# Patient Record
Sex: MALE | URBAN - METROPOLITAN AREA
[De-identification: names, ages, dates, MRNs, and addresses within clinical notes are randomized per-mention and may not be internally consistent; named-entity substitution may affect disease eponyms.]

---

## 2017-09-15 ENCOUNTER — HOSPITAL ENCOUNTER (OUTPATIENT)
Dept: MEDSURG UNIT | Facility: HOSPITAL | Age: 42
End: 2017-09-16
Attending: ORTHOPAEDIC SURGERY | Admitting: ORTHOPAEDIC SURGERY

## 2017-09-15 LAB
ABS NEUT (OLG): 3.9 X10(3)/MCL (ref 2.1–9.2)
ALBUMIN SERPL-MCNC: 3.3 GM/DL (ref 3.4–5)
ALBUMIN/GLOB SERPL: 1.2 RATIO (ref 1.1–2)
ALP SERPL-CCNC: 50 UNIT/L (ref 50–136)
ALT SERPL-CCNC: 47 UNIT/L (ref 12–78)
AST SERPL-CCNC: 24 UNIT/L (ref 15–37)
BASOPHILS # BLD AUTO: 0 X10(3)/MCL (ref 0–0.2)
BASOPHILS NFR BLD AUTO: 0 %
BILIRUB SERPL-MCNC: 0.3 MG/DL (ref 0.2–1)
BILIRUBIN DIRECT+TOT PNL SERPL-MCNC: 0.1 MG/DL (ref 0–0.5)
BILIRUBIN DIRECT+TOT PNL SERPL-MCNC: 0.2 MG/DL (ref 0–0.8)
BUN SERPL-MCNC: 18 MG/DL (ref 7–18)
CALCIUM SERPL-MCNC: 8.4 MG/DL (ref 8.5–10.1)
CHLORIDE SERPL-SCNC: 109 MMOL/L (ref 98–107)
CO2 SERPL-SCNC: 23 MMOL/L (ref 21–32)
CREAT SERPL-MCNC: 1.18 MG/DL (ref 0.7–1.3)
EOSINOPHIL # BLD AUTO: 0.1 X10(3)/MCL (ref 0–0.9)
EOSINOPHIL NFR BLD AUTO: 2 %
ERYTHROCYTE [DISTWIDTH] IN BLOOD BY AUTOMATED COUNT: 13.2 % (ref 11.5–17)
ETHANOL SERPL-MCNC: 24 MG/DL (ref 0–3)
GLOBULIN SER-MCNC: 2.8 GM/DL (ref 2.4–3.5)
GLUCOSE SERPL-MCNC: 126 MG/DL (ref 74–106)
HCT VFR BLD AUTO: 37.8 % (ref 42–52)
HGB BLD-MCNC: 13.2 GM/DL (ref 14–18)
LYMPHOCYTES # BLD AUTO: 2 X10(3)/MCL (ref 0.6–4.6)
LYMPHOCYTES NFR BLD AUTO: 30 %
MCH RBC QN AUTO: 29.3 PG (ref 27–31)
MCHC RBC AUTO-ENTMCNC: 34.9 GM/DL (ref 33–36)
MCV RBC AUTO: 83.8 FL (ref 80–94)
MONOCYTES # BLD AUTO: 0.6 X10(3)/MCL (ref 0.1–1.3)
MONOCYTES NFR BLD AUTO: 9 %
NEUTROPHILS # BLD AUTO: 3.9 X10(3)/MCL (ref 1.4–7.9)
NEUTROPHILS NFR BLD AUTO: 59 %
PLATELET # BLD AUTO: 157 X10(3)/MCL (ref 130–400)
PMV BLD AUTO: 9.8 FL (ref 9.4–12.4)
POTASSIUM SERPL-SCNC: 4 MMOL/L (ref 3.5–5.1)
PROT SERPL-MCNC: 6.1 GM/DL (ref 6.4–8.2)
RBC # BLD AUTO: 4.51 X10(6)/MCL (ref 4.7–6.1)
SODIUM SERPL-SCNC: 143 MMOL/L (ref 136–145)
WBC # SPEC AUTO: 6.6 X10(3)/MCL (ref 4.5–11.5)

## 2017-09-16 LAB
ABS NEUT (OLG): 9.27 X10(3)/MCL (ref 2.1–9.2)
APPEARANCE, UA: CLEAR
BACTERIA SPEC CULT: NORMAL /HPF
BASOPHILS # BLD AUTO: 0 X10(3)/MCL (ref 0–0.2)
BASOPHILS NFR BLD AUTO: 0 %
BILIRUB UR QL STRIP: NEGATIVE
BUN SERPL-MCNC: 15 MG/DL (ref 7–18)
CALCIUM SERPL-MCNC: 8.5 MG/DL (ref 8.5–10.1)
CHLORIDE SERPL-SCNC: 110 MMOL/L (ref 98–107)
CO2 SERPL-SCNC: 19 MMOL/L (ref 21–32)
COLOR UR: YELLOW
CREAT SERPL-MCNC: 0.98 MG/DL (ref 0.7–1.3)
EOSINOPHIL # BLD AUTO: 0 X10(3)/MCL (ref 0–0.9)
EOSINOPHIL NFR BLD AUTO: 0 %
ERYTHROCYTE [DISTWIDTH] IN BLOOD BY AUTOMATED COUNT: 13.2 % (ref 11.5–17)
GLUCOSE (UA): NEGATIVE
GLUCOSE SERPL-MCNC: 137 MG/DL (ref 74–106)
HCT VFR BLD AUTO: 40.9 % (ref 42–52)
HGB BLD-MCNC: 13.6 GM/DL (ref 14–18)
HGB UR QL STRIP: NEGATIVE
KETONES UR QL STRIP: NEGATIVE
LEUKOCYTE ESTERASE UR QL STRIP: NEGATIVE
LYMPHOCYTES # BLD AUTO: 0.8 X10(3)/MCL (ref 0.6–4.6)
LYMPHOCYTES NFR BLD AUTO: 8 %
MCH RBC QN AUTO: 27.7 PG (ref 27–31)
MCHC RBC AUTO-ENTMCNC: 33.3 GM/DL (ref 33–36)
MCV RBC AUTO: 83.3 FL (ref 80–94)
MONOCYTES # BLD AUTO: 0.3 X10(3)/MCL (ref 0.1–1.3)
MONOCYTES NFR BLD AUTO: 3 %
NEUTROPHILS # BLD AUTO: 9.27 X10(3)/MCL (ref 2.1–9.2)
NEUTROPHILS NFR BLD AUTO: 88 %
NITRITE UR QL STRIP: NEGATIVE
PH UR STRIP: 5.5 [PH] (ref 5–9)
PLATELET # BLD AUTO: 173 X10(3)/MCL (ref 130–400)
PMV BLD AUTO: 9.8 FL (ref 9.4–12.4)
POTASSIUM SERPL-SCNC: 4.3 MMOL/L (ref 3.5–5.1)
PROT UR QL STRIP: NEGATIVE
RBC # BLD AUTO: 4.91 X10(6)/MCL (ref 4.7–6.1)
RBC #/AREA URNS HPF: NORMAL /[HPF]
SODIUM SERPL-SCNC: 140 MMOL/L (ref 136–145)
SP GR UR STRIP: 1.02 (ref 1–1.03)
SQUAMOUS EPITHELIAL, UA: NORMAL
UROBILINOGEN UR STRIP-ACNC: 0.2
WBC # SPEC AUTO: 10.5 X10(3)/MCL (ref 4.5–11.5)
WBC #/AREA URNS HPF: NORMAL /HPF

## 2022-04-11 ENCOUNTER — HISTORICAL (OUTPATIENT)
Dept: ADMINISTRATIVE | Facility: HOSPITAL | Age: 47
End: 2022-04-11

## 2022-04-28 VITALS
DIASTOLIC BLOOD PRESSURE: 69 MMHG | BODY MASS INDEX: 27.26 KG/M2 | HEIGHT: 62 IN | SYSTOLIC BLOOD PRESSURE: 113 MMHG | WEIGHT: 148.13 LBS

## 2022-05-01 NOTE — ED PROVIDER NOTES
Patient:   Nickolas Gleason             MRN: [id number]            FIN: [id number]               Age:   42 years     Sex:  Male     :  1975   Associated Diagnoses:   Laceration of foot, right, complicated; Right foot injury; Foot injury - Major; Amputation of toe, right, traumatic, complicated   Author:   Prashant Covarrubias MD      Basic Information   Time seen: Date & time 9/15/2017 20:31:00.   History source: Patient.   Arrival mode: Ambulance.   History limitation: Language barrier.   Additional information: Patient's physician(s): None, Chief Complaint from Nursing Triage Note : Chief Complaint   9/15/2017 20:26 CDT      Chief Complaint           laceration to right foot with skill saw. fentanyl 100 mcg per ems  .      History of Present Illness   The patient presents with Foot injury from power Skil saw,     43 y/o male with no significant medical hx presents to the ED via EMS due to R foot laceration from dropped circular saw while cutting wood at home PTA (9/15/17). Pt was barefoot at time of injury. Pt admits to +ETOH, two beers this evening. and Patient said he was barefoot this evening.  Working at the house.  Lacerated his right foot with the Skil saw admits to drinking.  The onset was just prior to arrival.  The course/duration of symptoms is constant.  Type of injury: laceration.  Location: Right feet toes. The character of symptoms is pain and bleeding.  The degree at present is moderate.  The exacerbating factor is none.  The relieving factor is none.  Risk factors consist of none.  Prior episodes: none.  Therapy today: emergency medical services.  Associated symptoms: none.        Review of Systems   Constitutional symptoms:  Negative except as documented in HPI.   Skin symptoms:  Negative except as documented in HPI.   Eye symptoms:  Negative except as documented in HPI.   ENMT symptoms:  Negative except as documented in HPI.   Respiratory symptoms:  Negative except as documented in HPI.    Cardiovascular symptoms:  Negative except as documented in HPI.   Gastrointestinal symptoms:  Negative except as documented in HPI.   Genitourinary symptoms:  Negative except as documented in HPI.   Musculoskeletal symptoms:  Reports: Right, foot, pain, trauma (laceration).   Neurologic symptoms:  Negative except as documented in HPI.   Psychiatric symptoms:  Negative except as documented in HPI.   Endocrine symptoms:  Negative except as documented in HPI.   Hematologic/Lymphatic symptoms:  Negative except as documented in HPI.   Allergy/immunologic symptoms:  Negative except as documented in HPI.             Additional review of systems information: All other systems reviewed and otherwise negative.      Health Status   Allergies:    Allergic Reactions (Selected)  No Known Medication Allergies.   Medications:  (Selected)   Inpatient Medications  Ordered  Ancef: 2 gm, form: Vial, IV, Once, Infuse over: 30 minute(s), first dose 09/15/17 20:50:00 CDT, stop date 09/15/17 20:50:00 CDT, STAT, 24  Dilaudid: 0.5 mg, form: Injection, IV Push, Once, first dose 09/15/17 20:50:00 CDT, stop date 09/15/17 20:50:00 CDT, STAT, 24  Sodium Chloride 0.9% 1000mL 1,000 mL: 1,000 mL, 1,000 mL, IV, 200 mL/hr, start date 09/15/17 20:36:00 CDT  Zofran 2 mg/mL injectable solution: 4 mg, form: Injection, IV Push, q4hr PRN for nausea, first dose 09/15/17 20:50:00 CDT, 26,051.   Immunizations: no tetanus up to date, no influenza, no pneumococcal.      Past Medical/ Family/ Social History   Medical history: Pt reports no medical hx..   Surgical history: Pt reports no surgical hx..   Family history: Pt reports no family medical hx..   Social history: Alcohol use: Occasionally, Tobacco use: Denies, Drug use: Denies, Occupation: Employed, Family/social situation: , intact family.      Physical Examination               Vital Signs   Vital Signs   9/15/2017 20:26 CDT      Temperature Temporal Artery               37.4 DegC                              Peripheral Pulse Rate     82 bpm                             Respiratory Rate          16 br/min                             SpO2                      97 %                             Oxygen Therapy            Room air                             Systolic Blood Pressure   156 mmHg  HI                             Diastolic Blood Pressure  98 mmHg  HI  .   Measurements   9/15/2017 20:26 CDT      Weight Estimated          95 kg                             Height/Length Estimated   175 cm                             Body Mass Index Estimated 31.02 kg/m2  .   Basic Oxygen Information   9/15/2017 20:26 CDT      SpO2                      97 %                             Oxygen Therapy            Room air  .   General:  Alert, mild distress, anxious, obese, Not ill-appearing,    Skin:  Warm, dry, no rash, normal for ethnicity.    Head:  Normocephalic, atraumatic.    Neck:  Supple, trachea midline, no tenderness, no JVD, no carotid bruit.    Eye:  Pupils are equal, round and reactive to light, extraocular movements are intact.    Ears, nose, mouth and throat:  Tympanic membranes clear, oral mucosa moist, no pharyngeal erythema or exudate.    Cardiovascular:  Regular rate and rhythm, No murmur, Normal peripheral perfusion, No edema.    Respiratory:  Lungs are clear to auscultation, respirations are non-labored, breath sounds are equal.    Chest wall:  No tenderness.   Back:  Nontender, Normal range of motion, Normal alignment, no step-offs.    Musculoskeletal:  Other extremities appear to be benign, Lower extremity: 8cm diagonal laceration across R foot, beginning at midshaft of the 1st metatarsal, and amputating 5th toe at approximately 1st phalanx. Great and 2nd toe likely intact; probable extensor tendon laceration to 3rd, 4th and 5th toe., Laceration extends into the sole of the foot.  Beneath the 5th toe.    Gastrointestinal:  Soft, Nontender, Non distended, Normal bowel sounds, No organomegaly, Obese.     Genitourinary:  no CVA tenderness.   Neurological:  Alert and oriented to person, place, time, and situation, No focal neurological deficit observed, CN II-XII intact, normal sensory observed, normal motor observed, normal speech observed, normal coordination observed.    Lymphatics:  No lymphadenopathy.   Psychiatric:  Cooperative, appropriate mood & affect, normal judgment.       Medical Decision Making   Differential Diagnosis:  Laceration with open fractures of foot and tendon lacerations.   Documents reviewed:  Emergency department nurses' notes.   Orders  Xray    XR Foot Right Minimum 3 Views, Satish ADAMS, Prashant KEY, 09/15/17, 20:35, Ordered.   Results review:  Lab results : Lab View   9/15/2017 21:00 CDT      WBC                       6.6 x10(3)/mcL                             RBC                       4.51 x10(6)/mcL  LOW                             Hgb                       13.2 gm/dL  LOW                             Hct                       37.8 %  LOW                             Platelet                  157 x10(3)/mcL                             MCV                       83.8 fL                             MCH                       29.3 pg                             MCHC                      34.9 gm/dL                             RDW                       13.2 %                             MPV                       9.8 fL                             Abs Neut                  3.90 x10(3)/mcL                             Neutro Auto               59 %  NA                             Lymph Auto                30 %  NA                             Mono Auto                 9 %  NA                             Eos Auto                  2 %  NA                             Abs Eos                   0.1 x10(3)/mcL                             Basophil Auto             0 %  NA                             Abs Neutro                3.90 x10(3)/mcL                             Abs Lymph                 2.0 x10(3)/mcL                              Abs Mono                  0.6 x10(3)/mcL                             Abs Baso                  0.0 x10(3)/mcL  .   Foot/toes x-ray findings:  Bony involvement of the head of the 4th metatarsal. 5th toe amputation just distal to the proximate phalanx. Extensive soft tissue injury.      Reexamination/ Reevaluation   Time: 9/15/2017 21:13:00 .   Interventions: Metacarpal block as well as subcutaneous infiltration of Marcaine was effective in resolving the patient's pain acutely 20 cc Marcane 0.5% plain.      Impression and Plan   Diagnosis   Laceration of foot, right, complicated (WRD05-QH S91.311A)   Right foot injury (NHT42-DW S99.921A)   Laceration of foot, right, complicated (YQS25-IZ S91.311A)   Foot injury - Major (PNED [id number])   Amputation of toe, right, traumatic, complicated (NYE47-RG S98.131A)   Amputation of right 5th toe just distal to proximal phalanx, open laceration bony injury to head of 4th metatarsal, laceration with extensive tendon involvement 3rd and 4th toe right foot fractures are open and nondisplaced      Calls-Consults   -  9/15/2017 21:06:00 , Olvin ADAMS, Norman AHN    Plan   Condition: Unchanged.    Disposition: Admit time  9/15/2017 21:16:00, Admit to Surgery.    Counseled: Patient, Regarding diagnosis, Regarding diagnostic results, Regarding treatment plan, Patient indicated understanding of instructions.    Notes: I, Lowell Rojas, acted solely as a scribe for and in the presence of Dr. Covarrubias who performed the service., I, Prashant Covarrubias MD, a physician licensed to practice in this state, have  performed the physical evaluation,  history gathering,  and medical decision making that is reflected in this record..   MARNI Covarrubias MD.

## 2022-05-01 NOTE — H&P
REASON FOR ADMISSION:  Laceration to the dorsal aspect of right foot.    CHIEF COMPLAINT:  Right foot pain.    HISTORY OF PRESENT ILLNESS:  The patient is a 42-year-old male who presented to the emergency department after laceration was sustained to his right foot when a SKIL saw fell while it was in motion onto the top of his right foot.  He was not wearing any shoes.  He was working at home.  He has a laceration to the dorsal aspect of the foot, and Orthopedics was consulted for management of the laceration with partial amputation of the right small toe and a laceration involving the bone at the head of the 4th metatarsal.  Upon my evaluation at the bedside, the patient is awake, alert, and oriented.    REVIEW OF SYSTEMS:  A 10-system review is performed, which is negative other than the History of Present Illness.    PAST MEDICAL HISTORY:  He reports no past medical history.    PAST SURGICAL HISTORY:  He reports no past surgical history.    SOCIAL HISTORY:  He reports occasional alcohol use.  He did drink approximately 2 beers today.  He denies any illicit drugs or tobacco.  He is  and works.    PHYSICAL EXAMINATION:  GENERAL:  He is in no apparent distress.  He is awake, alert, and oriented.   HEAD, EYES, EARS, NOSE, AND THROAT:  His extraocular movements are all intact.  He is normocephalic, atraumatic.   PULMONARY:  He has unlabored respirations.  Symmetric chest rise.   CARDIOVASCULAR:  He has a normal rate with regular rhythm and normal peripheral perfusion.   GI:  His abdomen is soft, nontender, and nondistended.     INTEGUMENTARY:  Except for his right foot, his skin is clean, dry, and intact with no lesions or abrasions.   MUSCULOSKELETAL:  Evaluation of the right lower extremity:  He has a large laceration of the dorsal aspect of his right foot extending obliquely in the mid portion of the dorsum of the foot on the medial side out laterally and distally including the base of the 4th  metatarsal head and a laceration with a near complete amputation of the small toe of the right foot.  He has intact EHL, FHL, as well as the extensors to the 2nd digit.  Remaining extensors are weak due to pain.  They were visible and lacerated in the bed of the wound.  He has active venous oozing, but no arterial bleeding noted.  The compartments of his foot are soft.  He has no undue swelling or pain with passive range of motion of his digits.    RADIOLOGY:  X-ray evaluations reveal a nondisplaced fracture of the right 4th metatarsal head with a disarticulation of the distal segment of his right small toe.    ASSESSMENT:  Laceration, right foot, complex, with multiple tendon involvement as well as partial amputation, small toe, and fracture of the right 4th metatarsal head.    PLAN:  The risks, benefits, and alternatives to treatment were discussed at length with the patient today, including, but not limited to pain, bleeding, scarring, infection, damage to neurovascular structures, malunion, nonunion, need for future procedures, and complications leading to amputation and even death.  His tetanus has been updated.  He has received antibiotics.  He will be going to the operating room today to address his large contaminated laceration.  We will attempt to repair all of his extensors.  He will need a partial amputation of the fifth digit.  He will undergo closed treatment of his metatarsal head after irrigation and debridement of the open fracture.  He understands all that we have discussed.  The site is marked.  Consents were obtained.  He will be taken to the operating room later today, admitted for 23-hour observation, continue him on antibiotics, and plan for discharge home tomorrow.        ______________________________  MD JANELL Enriquez/UD  DD:  09/15/2017  Time:  09:24PM  DT:  09/15/2017  Time:  09:59PM  Job #:  [id number]    The H&P was reviewed, the patient was examined, and the following changes to the  patients condition are noted:  ______________________________________________________________________________  ______________________________________________________________________________  ______________________________________________________________________________  [  ] No changes to the patient's condition:      ______________________________                                             ___________________  PHYSICIAN SIGNATURE                                                             DATE/TIME

## 2022-05-01 NOTE — OP NOTE
DATE OF SURGERY:    09/15/2017    SURGEON:  Norman Bah MD    PREOPERATIVE DIAGNOSES:    1. Right foot complex laceration including the tendons.  2. Open fracture right 4th metatarsal head.  3. Traumatic amputation partial of the 5th toe.    POSTOPERATIVE DIAGNOSES:    1. Right foot complex laceration including the tendons.  2. Open fracture right 4th metatarsal head.  3. Traumatic amputation partial of the 5th toe.    PROCEDURE PERFORMED:    1. Irrigation and debridement open fracture right 4th metatarsal head including skin, subcutaneous tissue, muscle, and bone.  2. Laceration repair of right foot complex, 8 cm.  3. Amputation right small toe.  4. Repair extensor tendons to the 2nd, 3rd, and 4th digits.    ANESTHESIA:  General endotracheal anesthesia.    ESTIMATED BLOOD LOSS:  5 cc.    COMPLICATIONS:  None.    COUNTS:  All counts correct x2 at the end of the case.    INDICATIONS FOR PROCEDURE:  The patient is a 42-year-old male who was working at home cutting wood with a skill saw, and slipped and fell onto his foot.  He was not wearing any shoes and was running and cut the dorsal aspect of his right foot, sparing his great toe.  He had lacerations extending from the mid portion of the mid foot distally and laterally, exiting near the small to partial amputation of the small toe with obvious extensor injuries at the base of the wound.  No active bleeding.  The risks, benefits and alternatives of treatment were discussed, and he was taken to the operating room for exploration of the wound with repair of his extensor tendon and amputation of his right small toe.    PROCEDURE IN DETAIL:  After informed consent was obtained, the patient was met in the preoperative holding area and site was marked.  He was taken to the operating room and placed supine on the operating table.  General endotracheal anesthesia was induced.  All bony prominences were well padded.  Preoperative antibiotics were given.  His right lower  extremity was prepped and draped in a standard sterile fashion.  Time-out was done to indicate the correct operative limb and procedure.       Starting first with the irrigation, the wound was evaluated and found to have no foreign bodies or debris in the wound.  Skin edges were ellipsed with a 15 blade sharply to fresh bleeding edges.  He had damaged tendons and muscles.  These were debrided with a rongeur.  He had a fracture of the metatarsal head of the right 4th metatarsal with flakes of bone and cartilage imbedded within the tissue.  These were removed with a rongeur.  This was thoroughly irrigated with 3 liters of normal saline with cysto tubing.  The ends of the extensor tendons were easily identifiable to the 2nd, 3rd, and 4th digits.  These were repaired using a 3-0 nylon suture end-to-end repair.  The capsule of the right 4th MTP joint was also repaired as it was found to be to be open.  He had a near-complete amputation of the right small toe through the proximal interphalangeal joint.  His proximal segment of the small toe remained intact.  This was debrided.  The skin holding the toe in place was cut sharply.  The toe was discarded.  A layered closure of the wounds were then performed using 2-0 Monocryl and 3-0 nylon in good approximation over the stump with no undue tension on the wound.  The wound was then dressed with Xeroform, 4x4's, cast padding, and a posterior splint.  The patient was awakened, extubated, and taken to recovery in stable condition.    POSTOPERATIVE PLAN:  He will be admitted to the floor for 23-hour observation, given IV antibiotics.  He will be nonweightbearing to the right lower extremity.  Plan for discharge home tomorrow.  Follow up in 1 week for wound evaluation.        ______________________________  MD JANELL Enriquez/ÁLVARO  DD:  09/15/2017  Time:  11:40PM  DT:  09/17/2017  Time:  12:37PM  Job #:  [id number]

## 2022-05-01 NOTE — DISCHARGE SUMMARY
* Final Report *  Brief Operative Note LG     Patient:   Nickolas Gleason             MRN: [id number]            FIN: [id number]               Age:   42 years     Sex:  Male     :  1975   Associated Diagnoses:   Open fracture of fourth metatarsal bone of right foot; Laceration of intrinsic muscle and tendon at ankle and foot level, right foot, initial encounter; Amputation of toe, right, traumatic, complicated   Author:   Norman Bah MD      Brief Operative Note   Operative Information   Date/ Time:  9/15/2017 23:11:00.     Preoperative Diagnosis: Open fracture of fourth metatarsal bone of right foot (CAJ77-AI S92.341B), Laceration of intrinsic muscle and tendon at ankle and foot level, right foot, initial encounter (BQB37-WH S96.221A), Amputation of toe, right, traumatic, complicated (CYR99-VE S98.131A).     Postoperative Diagnosis: Open fracture of fourth metatarsal bone of right foot (JPL78-IL S92.341B), Laceration of intrinsic muscle and tendon at ankle and foot level, right foot, initial encounter (MKI65-WK S96.221A), Amputation of toe, right, traumatic, complicated (CYY04-OV S98.131A).     Procedures Performed: 1. I&D open fx fourth MT  2. Lac repair, 8cm  3. Extensor tendon repair 2nd/3rd/4th digits  4. Amputation small toe.     Indications: see dictated operative report.     Surgeon: Norman Bah MD.     Esimated blood loss: loss  5  cc.     A/P: Tolerated procedure well. Admit for 23hour obs. NWB RLE. Abx for 24 hours..       Result type: Orthopedic Progress Note  Result date: September 15, 2017 23:11 CDT  Result status: Auth (Verified)  Result title: Brief Operative Note LG  Performed by: Norman Bah MD on September 15, 2017 23:29 CDT  Verified by: Norman Bah MD on September 15, 2017 23:29 CDT  Encounter info: [id number], [facility], Observation, 9/15/2017 - 2017